# Patient Record
Sex: FEMALE | Race: OTHER | Employment: FULL TIME | URBAN - METROPOLITAN AREA
[De-identification: names, ages, dates, MRNs, and addresses within clinical notes are randomized per-mention and may not be internally consistent; named-entity substitution may affect disease eponyms.]

---

## 2018-02-13 ENCOUNTER — APPOINTMENT (OUTPATIENT)
Dept: LAB | Age: 23
End: 2018-02-13
Attending: FAMILY MEDICINE
Payer: COMMERCIAL

## 2018-02-13 ENCOUNTER — LAB ENCOUNTER (OUTPATIENT)
Dept: LAB | Age: 23
End: 2018-02-13
Attending: FAMILY MEDICINE
Payer: COMMERCIAL

## 2018-02-13 ENCOUNTER — OFFICE VISIT (OUTPATIENT)
Dept: FAMILY MEDICINE CLINIC | Facility: CLINIC | Age: 23
End: 2018-02-13

## 2018-02-13 VITALS
HEART RATE: 58 BPM | HEIGHT: 66 IN | WEIGHT: 156 LBS | DIASTOLIC BLOOD PRESSURE: 74 MMHG | BODY MASS INDEX: 25.07 KG/M2 | TEMPERATURE: 98 F | SYSTOLIC BLOOD PRESSURE: 117 MMHG

## 2018-02-13 DIAGNOSIS — B35.2 TINEA MANUS: ICD-10-CM

## 2018-02-13 DIAGNOSIS — Z00.00 PHYSICAL EXAM: ICD-10-CM

## 2018-02-13 DIAGNOSIS — Z00.00 PHYSICAL EXAM: Primary | ICD-10-CM

## 2018-02-13 LAB
ALBUMIN SERPL BCP-MCNC: 4.1 G/DL (ref 3.5–4.8)
ALBUMIN/GLOB SERPL: 1.8 {RATIO} (ref 1–2)
ALP SERPL-CCNC: 43 U/L (ref 32–100)
ALT SERPL-CCNC: 14 U/L (ref 14–54)
ANION GAP SERPL CALC-SCNC: 7 MMOL/L (ref 0–18)
AST SERPL-CCNC: 21 U/L (ref 15–41)
BASOPHILS # BLD: 0 K/UL (ref 0–0.2)
BASOPHILS NFR BLD: 1 %
BILIRUB SERPL-MCNC: 0.9 MG/DL (ref 0.3–1.2)
BILIRUB UR QL: NEGATIVE
BUN SERPL-MCNC: 8 MG/DL (ref 8–20)
BUN/CREAT SERPL: 11 (ref 10–20)
CALCIUM SERPL-MCNC: 9.2 MG/DL (ref 8.5–10.5)
CHLORIDE SERPL-SCNC: 108 MMOL/L (ref 95–110)
CHOLEST SERPL-MCNC: 116 MG/DL (ref 110–200)
CO2 SERPL-SCNC: 24 MMOL/L (ref 22–32)
COLOR UR: YELLOW
CREAT SERPL-MCNC: 0.73 MG/DL (ref 0.5–1.5)
EOSINOPHIL # BLD: 0.3 K/UL (ref 0–0.7)
EOSINOPHIL NFR BLD: 5 %
ERYTHROCYTE [DISTWIDTH] IN BLOOD BY AUTOMATED COUNT: 14.2 % (ref 11–15)
GLOBULIN PLAS-MCNC: 2.3 G/DL (ref 2.5–3.7)
GLUCOSE SERPL-MCNC: 83 MG/DL (ref 70–99)
GLUCOSE UR-MCNC: NEGATIVE MG/DL
HBA1C MFR BLD: 5 % (ref 4–6)
HCT VFR BLD AUTO: 39.7 % (ref 35–48)
HDLC SERPL-MCNC: 44 MG/DL
HGB BLD-MCNC: 13.3 G/DL (ref 12–16)
HGB UR QL STRIP.AUTO: NEGATIVE
KETONES UR-MCNC: NEGATIVE MG/DL
LDLC SERPL CALC-MCNC: 59 MG/DL (ref 0–99)
LYMPHOCYTES # BLD: 1.8 K/UL (ref 1–4)
LYMPHOCYTES NFR BLD: 32 %
MCH RBC QN AUTO: 25.8 PG (ref 27–32)
MCHC RBC AUTO-ENTMCNC: 33.4 G/DL (ref 32–37)
MCV RBC AUTO: 77.2 FL (ref 80–100)
MONOCYTES # BLD: 0.5 K/UL (ref 0–1)
MONOCYTES NFR BLD: 8 %
NEUTROPHILS # BLD AUTO: 3.1 K/UL (ref 1.8–7.7)
NEUTROPHILS NFR BLD: 55 %
NITRITE UR QL STRIP.AUTO: NEGATIVE
NONHDLC SERPL-MCNC: 72 MG/DL
OSMOLALITY UR CALC.SUM OF ELEC: 285 MOSM/KG (ref 275–295)
PATIENT FASTING: YES
PH UR: 5 [PH] (ref 5–8)
PLATELET # BLD AUTO: 184 K/UL (ref 140–400)
PMV BLD AUTO: 9.4 FL (ref 7.4–10.3)
POTASSIUM SERPL-SCNC: 4.2 MMOL/L (ref 3.3–5.1)
PROT SERPL-MCNC: 6.4 G/DL (ref 5.9–8.4)
PROT UR-MCNC: NEGATIVE MG/DL
RBC # BLD AUTO: 5.14 M/UL (ref 3.7–5.4)
RBC #/AREA URNS AUTO: 1 /HPF
RBC #/AREA URNS AUTO: 2 /HPF
SODIUM SERPL-SCNC: 139 MMOL/L (ref 136–144)
SP GR UR STRIP: 1.02 (ref 1–1.03)
TRIGL SERPL-MCNC: 64 MG/DL (ref 1–149)
TSH SERPL-ACNC: 3.02 UIU/ML (ref 0.45–5.33)
UROBILINOGEN UR STRIP-ACNC: <2
VIT C UR-MCNC: NEGATIVE MG/DL
WBC # BLD AUTO: 5.6 K/UL (ref 4–11)
WBC #/AREA URNS AUTO: 21 /HPF
WBC #/AREA URNS AUTO: 24 /HPF

## 2018-02-13 PROCEDURE — 93005 ELECTROCARDIOGRAM TRACING: CPT

## 2018-02-13 PROCEDURE — 83036 HEMOGLOBIN GLYCOSYLATED A1C: CPT

## 2018-02-13 PROCEDURE — 84443 ASSAY THYROID STIM HORMONE: CPT

## 2018-02-13 PROCEDURE — 99385 PREV VISIT NEW AGE 18-39: CPT | Performed by: FAMILY MEDICINE

## 2018-02-13 PROCEDURE — 80061 LIPID PANEL: CPT

## 2018-02-13 PROCEDURE — 85025 COMPLETE CBC W/AUTO DIFF WBC: CPT

## 2018-02-13 PROCEDURE — 99212 OFFICE O/P EST SF 10 MIN: CPT | Performed by: FAMILY MEDICINE

## 2018-02-13 PROCEDURE — 87086 URINE CULTURE/COLONY COUNT: CPT | Performed by: FAMILY MEDICINE

## 2018-02-13 PROCEDURE — 36415 COLL VENOUS BLD VENIPUNCTURE: CPT

## 2018-02-13 PROCEDURE — 81001 URINALYSIS AUTO W/SCOPE: CPT

## 2018-02-13 PROCEDURE — 93010 ELECTROCARDIOGRAM REPORT: CPT | Performed by: FAMILY MEDICINE

## 2018-02-13 PROCEDURE — 81015 MICROSCOPIC EXAM OF URINE: CPT | Performed by: FAMILY MEDICINE

## 2018-02-13 PROCEDURE — 80053 COMPREHEN METABOLIC PANEL: CPT

## 2018-02-13 PROCEDURE — 99203 OFFICE O/P NEW LOW 30 MIN: CPT | Performed by: FAMILY MEDICINE

## 2018-02-13 RX ORDER — KETOCONAZOLE 200 MG/1
200 TABLET ORAL DAILY
Qty: 30 TABLET | Refills: 0 | Status: SHIPPED | OUTPATIENT
Start: 2018-02-13 | End: 2018-03-13

## 2018-02-13 RX ORDER — CLOTRIMAZOLE AND BETAMETHASONE DIPROPIONATE 10; .64 MG/G; MG/G
1 CREAM TOPICAL 2 TIMES DAILY
Qty: 45 G | Refills: 1 | Status: SHIPPED | OUTPATIENT
Start: 2018-02-13 | End: 2018-03-13

## 2018-02-13 NOTE — PROGRESS NOTES
2/13/2018  9:10 AM    Crystal Muller is a 25year old female. Chief complaint(s): Patient presents with:  Routine Physical    HPI:     Crystal Muller primary complaint is regarding CPE.    2  Crystal Muller is a 25year old female present Constitutional: Negative for appetite change, chills, diaphoresis, fatigue and fever. HENT: Negative for ear pain, hearing loss, nosebleeds and tinnitus. Eyes: Negative for visual disturbance.    Respiratory: Negative for apnea, cough, chest tightnes respiratory distress. Right breast exhibits no mass, no nipple discharge and no skin change. Left breast exhibits no mass, no nipple discharge and no skin change. Lungs clear to auscultation bilaterally. Abdominal: Soft.    Abdomen is soft with normoact dentist), and healthy habits & social competence & responsibilities: Recommendations on physical activity; exercise daily or at least 3 times a week for 30-60 minutes doing cardiovascular exercise.  Patient educated on self breast examination to be done on

## 2018-02-19 ENCOUNTER — TELEPHONE (OUTPATIENT)
Dept: OTHER | Age: 23
End: 2018-02-19

## 2018-02-19 DIAGNOSIS — Z00.00 ROUTINE GENERAL MEDICAL EXAMINATION AT HEALTH CARE FACILITY: Primary | ICD-10-CM

## 2018-02-20 NOTE — TELEPHONE ENCOUNTER
Notes Recorded by Mary Huang RN on 2/19/2018 at 8:01 PM CST  Yosi see telephone encounter. Per lab pt needs to come back for urine culture. See telephone encounter.   ------    Notes Recorded by Jose De Jesus Briones MD on 2/19/2018 at 7:17 PM CST  [place]

## 2018-02-26 NOTE — TELEPHONE ENCOUNTER
See messages below. Chart reviewed, 2/13/18 urine cx results probable contamination. Dr Bucky Hamilton ordered urine cx add on but not ordered. Several calls to pt unsuccessful with no answer or call back.     Urine cx generated and copy mailed to pts' home address on

## 2018-03-13 ENCOUNTER — OFFICE VISIT (OUTPATIENT)
Dept: FAMILY MEDICINE CLINIC | Facility: CLINIC | Age: 23
End: 2018-03-13

## 2018-03-13 VITALS
SYSTOLIC BLOOD PRESSURE: 118 MMHG | HEART RATE: 59 BPM | WEIGHT: 151 LBS | DIASTOLIC BLOOD PRESSURE: 78 MMHG | TEMPERATURE: 98 F | BODY MASS INDEX: 24 KG/M2

## 2018-03-13 DIAGNOSIS — B35.2 TINEA MANUS: Primary | ICD-10-CM

## 2018-03-13 PROCEDURE — 99213 OFFICE O/P EST LOW 20 MIN: CPT | Performed by: FAMILY MEDICINE

## 2018-03-13 PROCEDURE — 99212 OFFICE O/P EST SF 10 MIN: CPT | Performed by: FAMILY MEDICINE

## 2018-03-13 RX ORDER — CLOTRIMAZOLE AND BETAMETHASONE DIPROPIONATE 10; .64 MG/G; MG/G
1 CREAM TOPICAL 2 TIMES DAILY
Qty: 45 G | Refills: 1 | Status: SHIPPED | OUTPATIENT
Start: 2018-03-13 | End: 2018-07-16

## 2018-03-13 RX ORDER — KETOCONAZOLE 200 MG/1
200 TABLET ORAL DAILY
Qty: 30 TABLET | Refills: 0 | Status: SHIPPED | OUTPATIENT
Start: 2018-03-13 | End: 2018-04-10

## 2018-03-13 NOTE — PROGRESS NOTES
3/13/2018  11:12 AM    Jairo Morris is a 25year old female. Chief complaint(s): Patient presents with: Follow - Up  Derm Problem  Lab Results    HPI:     Jairo Morris primary complaint is regarding as above.      Patient is a 62-year-old Psychiatric/Behavioral: Negative for depressed mood. PHYSICAL EXAM:   Physical Exam    Constitutional: She appears well-developed and well-nourished.    /78 (BP Location: Right arm, Patient Position: Sitting, Cuff Size: adult)   Pulse 59   Tem LDL Cholesterol 59 0 - 99 mg/dL   -TSH W REFLEX TO FREE T4   Result Value Ref Range   TSH 3.02 0.45 - 5.33 uIU/mL   -URINALYSIS, ROUTINE   Result Value Ref Range   Urine Color Yellow Yellow   Clarity Urine Hazy (A) Clear   Spec Gravity 1.020 1.002 - 1.03 deterioration or worsening of the medical condition. Also, inform the doctor with any new symptoms or medications' side effects. Keep hands clean and dry. FOLLOW-UP: Schedule a follow-up visit in 1 month / prn.        Orders This Visit:  No orders of

## 2018-03-19 ENCOUNTER — OFFICE VISIT (OUTPATIENT)
Dept: FAMILY MEDICINE CLINIC | Facility: CLINIC | Age: 23
End: 2018-03-19

## 2018-03-19 DIAGNOSIS — M94.0 COSTOCHONDRITIS: Primary | ICD-10-CM

## 2018-03-19 PROCEDURE — 99212 OFFICE O/P EST SF 10 MIN: CPT | Performed by: FAMILY MEDICINE

## 2018-03-19 PROCEDURE — 93005 ELECTROCARDIOGRAM TRACING: CPT | Performed by: FAMILY MEDICINE

## 2018-03-19 PROCEDURE — 93000 ELECTROCARDIOGRAM COMPLETE: CPT | Performed by: FAMILY MEDICINE

## 2018-03-19 PROCEDURE — 99214 OFFICE O/P EST MOD 30 MIN: CPT | Performed by: FAMILY MEDICINE

## 2018-03-20 VITALS
BODY MASS INDEX: 25 KG/M2 | HEART RATE: 53 BPM | DIASTOLIC BLOOD PRESSURE: 69 MMHG | SYSTOLIC BLOOD PRESSURE: 110 MMHG | WEIGHT: 153 LBS | TEMPERATURE: 98 F

## 2018-03-20 NOTE — PROGRESS NOTES
3/20/2018  8:35 AM    Cy Pérez is a 25year old female. Chief complaint(s): Patient presents with:  Chest Pain    HPI:     Cy Pérez primary complaint is regarding chest pain.      Patient is a 81-year-old  female with no sig Psychiatric/Behavioral: Negative for depressed mood. PHYSICAL EXAM:   Physical Exam    Constitutional: She appears well-developed and well-nourished.    /69   Pulse 53   Temp 98.4 °F (36.9 °C) (Oral)   Wt 153 lb (69.4 kg)   LMP 02/17/2018 ([place] <130 mg/dL   LDL Cholesterol 59 0 - 99 mg/dL   -TSH W REFLEX TO FREE T4   Result Value Ref Range   TSH 3.02 0.45 - 5.33 uIU/mL   -URINALYSIS, ROUTINE   Result Value Ref Range   Urine Color Yellow Yellow   Clarity Urine Hazy (A) Clear   Spec Gravity 1.020 1 a follow-up visit in prn . Orders This Visit:  No orders of the defined types were placed in this encounter.       Meds This Visit:    No prescriptions requested or ordered in this encounter    Imaging & Referrals:  ELECTROCARDIOGRAM, COMPLETE

## 2018-04-10 ENCOUNTER — OFFICE VISIT (OUTPATIENT)
Dept: FAMILY MEDICINE CLINIC | Facility: CLINIC | Age: 23
End: 2018-04-10

## 2018-04-10 ENCOUNTER — LAB ENCOUNTER (OUTPATIENT)
Dept: LAB | Age: 23
End: 2018-04-10
Attending: FAMILY MEDICINE
Payer: COMMERCIAL

## 2018-04-10 VITALS
SYSTOLIC BLOOD PRESSURE: 119 MMHG | HEART RATE: 66 BPM | WEIGHT: 151 LBS | BODY MASS INDEX: 24 KG/M2 | TEMPERATURE: 98 F | DIASTOLIC BLOOD PRESSURE: 76 MMHG

## 2018-04-10 DIAGNOSIS — B35.2 TINEA MANUS: ICD-10-CM

## 2018-04-10 DIAGNOSIS — K21.9 GASTROESOPHAGEAL REFLUX DISEASE WITHOUT ESOPHAGITIS: Primary | ICD-10-CM

## 2018-04-10 PROCEDURE — 99212 OFFICE O/P EST SF 10 MIN: CPT | Performed by: FAMILY MEDICINE

## 2018-04-10 PROCEDURE — 84450 TRANSFERASE (AST) (SGOT): CPT

## 2018-04-10 PROCEDURE — 36415 COLL VENOUS BLD VENIPUNCTURE: CPT

## 2018-04-10 PROCEDURE — 84460 ALANINE AMINO (ALT) (SGPT): CPT

## 2018-04-10 PROCEDURE — 99214 OFFICE O/P EST MOD 30 MIN: CPT | Performed by: FAMILY MEDICINE

## 2018-04-10 RX ORDER — KETOCONAZOLE 200 MG/1
200 TABLET ORAL DAILY
Qty: 30 TABLET | Refills: 0 | Status: SHIPPED | OUTPATIENT
Start: 2018-04-10 | End: 2018-07-16

## 2018-04-10 RX ORDER — NAFTIFINE HYDROCHLORIDE 20 MG/G
CREAM TOPICAL
Qty: 30 G | Refills: 0 | Status: SHIPPED | OUTPATIENT
Start: 2018-04-10 | End: 2018-07-16

## 2018-04-10 RX ORDER — FAMOTIDINE 40 MG/1
40 TABLET, FILM COATED ORAL DAILY
Qty: 60 TABLET | Refills: 1 | Status: SHIPPED | OUTPATIENT
Start: 2018-04-10 | End: 2018-07-16

## 2018-04-10 NOTE — PROGRESS NOTES
4/10/2018  10:21 AM    Diego Quiroz is a 25year old female. Chief complaint(s): Patient presents with: Follow - Up  Muscle Pain  Pain: pt c/o left side pain X 1 time    HPI:     Diego Quiroz primary complaint is regarding abdominal pain. ketoconazole 200 MG Oral Tab Take 1 tablet (200 mg total) by mouth daily. Disp: 30 tablet Rfl: 0   famotidine 40 MG Oral Tab Take 1 tablet (40 mg total) by mouth daily.  Disp: 60 tablet Rfl: 1   clotrimazole-betamethasone 1-0.05 % External Cream Apply 1 A Brianda Walker     Results for orders placed or performed in visit on 63/80/12  -COMP METABOLIC PANEL (14)   Result Value Ref Range   Glucose 83 70 - 99 mg/dL   Sodium 139 136 - 144 mmol/L   Potassium 4.2 3.3 - 5.1 mmol/L   Chloride 108 95 - 110 mmol/L   CO2 2 39.7 35.0 - 48.0 %   MCV 77.2 (L) 80.0 - 100.0 fL   MCH 25.8 (L) 27.0 - 32.0 pg   MCHC 33.4 32.0 - 37.0 g/dl   RDW 14.2 11.0 - 15.0 %    140 - 400 K/UL   MPV 9.4 7.4 - 10.3 fL   Neutrophil % 55 %   Lymphocyte % 32 %   Monocyte % 8 %   Eosinophil % 5 Placed This Encounter      AST (SGOT) [E]      ALT(SGPT) [E]      RECOMMENDATIONS given include: Please, call our office with any questions or concerns.  Notify Dr Shannan Sylvester or the [address] [place] [address] if there is a deterioration or worsening of the medical condi

## 2018-04-11 ENCOUNTER — TELEPHONE (OUTPATIENT)
Dept: FAMILY MEDICINE CLINIC | Facility: CLINIC | Age: 23
End: 2018-04-11

## 2018-04-11 NOTE — TELEPHONE ENCOUNTER
----- Message from Uriel Black MD sent at 4/10/2018  6:25 PM CDT -----  Please call patient, results are within normal limits.

## 2018-04-16 ENCOUNTER — OFFICE VISIT (OUTPATIENT)
Dept: FAMILY MEDICINE CLINIC | Facility: CLINIC | Age: 23
End: 2018-04-16

## 2018-04-16 VITALS
BODY MASS INDEX: 24 KG/M2 | HEART RATE: 57 BPM | TEMPERATURE: 98 F | SYSTOLIC BLOOD PRESSURE: 112 MMHG | WEIGHT: 151 LBS | DIASTOLIC BLOOD PRESSURE: 75 MMHG

## 2018-04-16 DIAGNOSIS — M94.0 COSTOCHONDRITIS: Primary | ICD-10-CM

## 2018-04-16 PROCEDURE — 99213 OFFICE O/P EST LOW 20 MIN: CPT | Performed by: FAMILY MEDICINE

## 2018-04-16 PROCEDURE — 99212 OFFICE O/P EST SF 10 MIN: CPT | Performed by: FAMILY MEDICINE

## 2018-04-16 NOTE — PROGRESS NOTES
4/16/2018  2:40 PM    Griffin Coley is a 25year old female. Chief complaint(s): Patient presents with:  Return To Work    HPI:     Griffin Brijesh primary complaint is regarding costochondritis.      Patient is a 73-year-old  female wi fatigue and fever. HENT: Negative for ear pain and sore throat. Respiratory: Negative for cough and wheezing. Cardiovascular: Negative for chest pain and palpitations.    Gastrointestinal: Negative for nausea, vomiting, abdominal pain, diarrhea and to work today. FOLLOW-UP: Schedule a follow-up visit in  [facility]. Orders This Visit:  No orders of the defined types were placed in this encounter.       Meds This Visit:    No prescriptions requested or ordered in this encounter    Imaging &

## 2018-07-16 ENCOUNTER — OFFICE VISIT (OUTPATIENT)
Dept: FAMILY MEDICINE CLINIC | Facility: CLINIC | Age: 23
End: 2018-07-16

## 2018-07-16 VITALS
DIASTOLIC BLOOD PRESSURE: 74 MMHG | TEMPERATURE: 98 F | BODY MASS INDEX: 24 KG/M2 | SYSTOLIC BLOOD PRESSURE: 111 MMHG | WEIGHT: 150 LBS | HEART RATE: 49 BPM

## 2018-07-16 DIAGNOSIS — J30.1 ALLERGIC RHINITIS DUE TO POLLEN, UNSPECIFIED SEASONALITY: Primary | ICD-10-CM

## 2018-07-16 PROCEDURE — 99212 OFFICE O/P EST SF 10 MIN: CPT | Performed by: FAMILY MEDICINE

## 2018-07-16 PROCEDURE — 99214 OFFICE O/P EST MOD 30 MIN: CPT | Performed by: FAMILY MEDICINE

## 2018-07-16 RX ORDER — FLUTICASONE PROPIONATE 50 MCG
2 SPRAY, SUSPENSION (ML) NASAL DAILY
Qty: 1 INHALER | Refills: 1 | Status: SHIPPED | OUTPATIENT
Start: 2018-07-16 | End: 2018-08-18

## 2018-07-16 RX ORDER — FEXOFENADINE HCL 180 MG/1
180 TABLET ORAL DAILY
Qty: 90 TABLET | Refills: 1 | Status: SHIPPED | OUTPATIENT
Start: 2018-07-16 | End: 2018-08-18

## 2018-07-16 NOTE — PROGRESS NOTES
2018 4:23 PM    aDnilo Crespo, : 10/25/1995  Patient presents with:  Sore Throat  Nasal Congestion    HPI:     Danilo Crespo is a 25year old female who presents for evaluation of a chief complaint of runny nose, sore throat and ear nuzhat children: N/A     Occupational History  None on file     Social History Main Topics   Smoking status: Never Smoker    Smokeless tobacco: Never Used    Alcohol use No    Drug use: No    Sexual activity: No     Other Topics Concern   None on file     Social or concerns. Notify the doctor if there is a deterioration or worsening of the medical condition. Also, inform the doctor with any new symptoms or medications' side effects. .    FOLLOW-UP:   Instructed to call if new or worsening symptoms develop.   Schedul

## 2018-08-02 ENCOUNTER — OCC HEALTH (OUTPATIENT)
Dept: OTHER | Facility: HOSPITAL | Age: 23
End: 2018-08-02
Attending: PREVENTIVE MEDICINE

## 2018-08-02 ENCOUNTER — OFFICE VISIT (OUTPATIENT)
Dept: FAMILY MEDICINE CLINIC | Facility: CLINIC | Age: 23
End: 2018-08-02
Payer: COMMERCIAL

## 2018-08-02 VITALS
SYSTOLIC BLOOD PRESSURE: 115 MMHG | TEMPERATURE: 98 F | WEIGHT: 147 LBS | DIASTOLIC BLOOD PRESSURE: 70 MMHG | HEIGHT: 66 IN | HEART RATE: 51 BPM | BODY MASS INDEX: 23.63 KG/M2

## 2018-08-02 DIAGNOSIS — Z02.0 SCHOOL PHYSICAL EXAM: Primary | ICD-10-CM

## 2018-08-02 DIAGNOSIS — Z01.84 IMMUNITY STATUS TESTING: Primary | ICD-10-CM

## 2018-08-02 LAB
HBV SURFACE AB SER QL: NONREACTIVE
HBV SURFACE AB SERPL IA-ACNC: 7.98 MIU/ML
RUBV IGG SER QL: POSITIVE
RUBV IGG SER-ACNC: 123.1 IU/ML (ref 10–?)

## 2018-08-02 PROCEDURE — 86735 MUMPS ANTIBODY: CPT

## 2018-08-02 PROCEDURE — 99214 OFFICE O/P EST MOD 30 MIN: CPT | Performed by: FAMILY MEDICINE

## 2018-08-02 PROCEDURE — 86787 VARICELLA-ZOSTER ANTIBODY: CPT

## 2018-08-02 PROCEDURE — 86762 RUBELLA ANTIBODY: CPT

## 2018-08-02 PROCEDURE — 86480 TB TEST CELL IMMUN MEASURE: CPT

## 2018-08-02 PROCEDURE — 86765 RUBEOLA ANTIBODY: CPT

## 2018-08-02 PROCEDURE — 86706 HEP B SURFACE ANTIBODY: CPT

## 2018-08-02 PROCEDURE — 99212 OFFICE O/P EST SF 10 MIN: CPT | Performed by: FAMILY MEDICINE

## 2018-08-02 NOTE — PROGRESS NOTES
8/2/2018  1:37 PM    Ac Posada is a 25year old female.     Chief complaint(s): Patient presents with:  Complete Form: Patient needs forms for nursing program, last Px was 02/13/18    HPI:     Ac Posada primary complaint is regarding leif (Cervarix)        06/08/2009 09/21/2010 02/07/2011      IPV                   03/23/2001      MMR                   02/01/1998 12/22/2000      Measles               07/25/1997 02/01/1998      Meningococcal (Katerin Romero)                          03/ Psychiatric/Behavioral: Negative for sleep disturbance and depressed mood. The patient is not nervous/anxious. PHYSICAL EXAM:   Physical Exam    Constitutional: She appears well-developed and well-nourished.    /70 (BP Location: Right arm, Pa were placed in this encounter.       Meds This Visit:    No prescriptions requested or ordered in this encounter    Imaging & Referrals:  None         Karey Galeazzi, MD

## 2018-08-03 LAB
MEV IGG SER IA-ACNC: NEGATIVE
MUV IGG SER IA-ACNC: POSITIVE
VZV IGG SER IA-ACNC: POSITIVE

## 2018-08-09 LAB
M TB TUBERC IFN-G BLD QL: NEGATIVE
M TB TUBERC IFN-G/MITOGEN IGNF BLD: 0.02 IU/ML
M TB TUBERC IGNF/MITOGEN IGNF CONTROL: >10 IU/ML
MITOGEN IGNF BCKGRD COR BLD-ACNC: 0.07 IU/ML

## 2018-08-13 ENCOUNTER — TELEPHONE (OUTPATIENT)
Dept: FAMILY MEDICINE CLINIC | Facility: CLINIC | Age: 23
End: 2018-08-13

## 2018-08-13 NOTE — TELEPHONE ENCOUNTER
Patient needs orders for Hep series and MMR booster for school. Needs ASAP. Please contact when ready.

## 2018-08-14 NOTE — TELEPHONE ENCOUNTER
If she needs boosters and vaccinations her make an appointment and return to clinic with immunization records.

## 2018-08-18 ENCOUNTER — NURSE ONLY (OUTPATIENT)
Dept: FAMILY MEDICINE CLINIC | Facility: CLINIC | Age: 23
End: 2018-08-18
Payer: COMMERCIAL

## 2018-08-18 VITALS
SYSTOLIC BLOOD PRESSURE: 106 MMHG | DIASTOLIC BLOOD PRESSURE: 73 MMHG | HEART RATE: 56 BPM | BODY MASS INDEX: 23 KG/M2 | WEIGHT: 141 LBS

## 2018-08-18 DIAGNOSIS — B35.2 TINEA MANUS: ICD-10-CM

## 2018-08-18 DIAGNOSIS — Z28.3 IMMUNIZATIONS INCOMPLETE: Primary | ICD-10-CM

## 2018-08-18 PROCEDURE — 99214 OFFICE O/P EST MOD 30 MIN: CPT | Performed by: FAMILY MEDICINE

## 2018-08-18 PROCEDURE — 90707 MMR VACCINE SC: CPT | Performed by: FAMILY MEDICINE

## 2018-08-18 PROCEDURE — 99212 OFFICE O/P EST SF 10 MIN: CPT | Performed by: FAMILY MEDICINE

## 2018-08-18 PROCEDURE — 90746 HEPB VACCINE 3 DOSE ADULT IM: CPT | Performed by: FAMILY MEDICINE

## 2018-08-18 PROCEDURE — 90471 IMMUNIZATION ADMIN: CPT | Performed by: FAMILY MEDICINE

## 2018-08-18 PROCEDURE — 90472 IMMUNIZATION ADMIN EACH ADD: CPT | Performed by: FAMILY MEDICINE

## 2018-08-18 RX ORDER — NAFTIFINE HYDROCHLORIDE 20 MG/G
CREAM TOPICAL
Qty: 30 G | Refills: 0 | Status: SHIPPED | OUTPATIENT
Start: 2018-08-18

## 2018-08-18 RX ORDER — NAFTIFINE HYDROCHLORIDE 20 MG/G
CREAM TOPICAL
Qty: 30 G | Refills: 0 | Status: SHIPPED | OUTPATIENT
Start: 2018-08-18 | End: 2018-08-18

## 2018-08-18 NOTE — PROGRESS NOTES
8/18/2018  8:03 AM    Babs Argueta is a 25year old female. Chief complaint(s): Patient presents with: Injection    HPI:     Babs Ellie primary complaint is regarding vaccines.      Patient is a 28-year-old female who presents for follow TDAP                  12/16/2015      Varicella             02/11/2003 09/21/2010      Medications (Active prior to today's visit):    Current Outpatient Prescriptions:  Naftifine HCl (NAFTIN) 2 % External Cream Apply to affected area once a day Disp: Nonreactive    Heptatitis B Surface Ab Quant 7.98 mIU/mL   -QUANTIFERON TB   Result Value Ref Range   Quantiferon TB Gold In-Tube Negative Negative   Quantiferon-TB1 Minus NIL 0.02 0.00 - 0.34 IU/mL   Quantiferon Mitogen Minus NIL >10.00 IU/mL   Quantifero

## 2018-12-13 ENCOUNTER — APPOINTMENT (OUTPATIENT)
Dept: LAB | Age: 23
End: 2018-12-13
Attending: PHYSICIAN ASSISTANT
Payer: COMMERCIAL

## 2018-12-13 ENCOUNTER — OFFICE VISIT (OUTPATIENT)
Dept: FAMILY MEDICINE CLINIC | Facility: CLINIC | Age: 23
End: 2018-12-13
Payer: COMMERCIAL

## 2018-12-13 ENCOUNTER — TELEPHONE (OUTPATIENT)
Dept: FAMILY MEDICINE CLINIC | Facility: CLINIC | Age: 23
End: 2018-12-13

## 2018-12-13 VITALS
WEIGHT: 154 LBS | TEMPERATURE: 98 F | BODY MASS INDEX: 24.75 KG/M2 | HEART RATE: 58 BPM | HEIGHT: 66 IN | SYSTOLIC BLOOD PRESSURE: 106 MMHG | RESPIRATION RATE: 18 BRPM | DIASTOLIC BLOOD PRESSURE: 71 MMHG

## 2018-12-13 DIAGNOSIS — R07.89 CHEST DISCOMFORT: ICD-10-CM

## 2018-12-13 DIAGNOSIS — R07.89 CHEST DISCOMFORT: Primary | ICD-10-CM

## 2018-12-13 DIAGNOSIS — R06.02 SHORTNESS OF BREATH: ICD-10-CM

## 2018-12-13 DIAGNOSIS — R07.89 OTHER CHEST PAIN: ICD-10-CM

## 2018-12-13 PROBLEM — R07.82 INTERCOSTAL PAIN: Status: ACTIVE | Noted: 2018-12-13

## 2018-12-13 PROCEDURE — 93005 ELECTROCARDIOGRAM TRACING: CPT

## 2018-12-13 PROCEDURE — 99212 OFFICE O/P EST SF 10 MIN: CPT | Performed by: PHYSICIAN ASSISTANT

## 2018-12-13 PROCEDURE — 93010 ELECTROCARDIOGRAM REPORT: CPT | Performed by: PHYSICIAN ASSISTANT

## 2018-12-13 PROCEDURE — 99213 OFFICE O/P EST LOW 20 MIN: CPT | Performed by: PHYSICIAN ASSISTANT

## 2018-12-13 RX ORDER — ALBUTEROL SULFATE 90 UG/1
2 AEROSOL, METERED RESPIRATORY (INHALATION) EVERY 4 HOURS PRN
Qty: 18 G | Refills: 5 | Status: SHIPPED | OUTPATIENT
Start: 2018-12-13

## 2018-12-13 NOTE — PROGRESS NOTES
HPI:     HPI    26year-old female is here in the office complaining of chest pain and SOB yesterday after eating lunch. The pain is dull, does not radiate anywhere, last couple seconds.  Patient works full time at [employer], she carries/lifts 10-50 lbs and a Substance and Sexual Activity      Alcohol use: No      Drug use: No      Sexual activity: No    Other Topics      Concerns:        Not on file    Social History Narrative      Not on file      Review of Systems:   Review of Systems   Constitutional: Adam Mandujano normal mood and affect. Her behavior is normal.       Assessment and Plan[de-identified]     Problem List Items Addressed This Visit        Cardiovascular    Other chest pain     Will order EKG. Advise patient to take Tylenol/Motrin prn for pain.  It might be muscle spa

## 2019-02-05 ENCOUNTER — TELEPHONE (OUTPATIENT)
Dept: FAMILY MEDICINE CLINIC | Facility: CLINIC | Age: 24
End: 2019-02-05

## 2019-02-05 NOTE — TELEPHONE ENCOUNTER
Pt saw Des Pickard on 12/13/18 and she is requesting a note stating that she is able to return to work, pt needs this as soon as possible.    Pt would like to pick this up in [place] if possible, please call pt when ready and let pt know which location to go to

## 2019-02-05 NOTE — TELEPHONE ENCOUNTER
Note is ready for  at [address] [place] [address] [place] location, pt notified and stated understanding

## 2022-09-15 ENCOUNTER — OFFICE VISIT (OUTPATIENT)
Dept: FAMILY MEDICINE CLINIC | Facility: CLINIC | Age: 27
End: 2022-09-15
Payer: COMMERCIAL

## 2022-09-15 VITALS
HEIGHT: 66 IN | SYSTOLIC BLOOD PRESSURE: 111 MMHG | WEIGHT: 190 LBS | DIASTOLIC BLOOD PRESSURE: 73 MMHG | HEART RATE: 69 BPM | BODY MASS INDEX: 30.53 KG/M2

## 2022-09-15 DIAGNOSIS — Z01.419 WELL WOMAN EXAM WITH ROUTINE GYNECOLOGICAL EXAM: Primary | ICD-10-CM

## 2022-09-15 PROCEDURE — 3008F BODY MASS INDEX DOCD: CPT | Performed by: NURSE PRACTITIONER

## 2022-09-15 PROCEDURE — 3078F DIAST BP <80 MM HG: CPT | Performed by: NURSE PRACTITIONER

## 2022-09-15 PROCEDURE — 3074F SYST BP LT 130 MM HG: CPT | Performed by: NURSE PRACTITIONER

## 2022-09-15 PROCEDURE — 99395 PREV VISIT EST AGE 18-39: CPT | Performed by: NURSE PRACTITIONER

## 2022-09-16 LAB
C TRACH DNA SPEC QL NAA+PROBE: NEGATIVE
N GONORRHOEA DNA SPEC QL NAA+PROBE: NEGATIVE

## 2022-09-16 RX ORDER — METRONIDAZOLE 500 MG/1
500 TABLET ORAL 2 TIMES DAILY
Qty: 14 TABLET | Refills: 0 | Status: SHIPPED | OUTPATIENT
Start: 2022-09-16 | End: 2022-09-23

## 2022-09-17 LAB
GENITAL VAGINOSIS SCREEN: POSITIVE
TRICHOMONAS SCREEN: NEGATIVE

## 2022-09-19 PROBLEM — R07.89 OTHER CHEST PAIN: Status: RESOLVED | Noted: 2018-12-13 | Resolved: 2022-09-19

## 2022-09-19 PROBLEM — R06.02 SHORTNESS OF BREATH: Status: RESOLVED | Noted: 2018-12-13 | Resolved: 2022-09-19

## 2022-09-19 NOTE — ASSESSMENT & PLAN NOTE
sreening labs  Please aim to eat a diet high in fresh fruits and vegetables, lean protein sources, complex carbohydrates and limited processed and fast foods. Try to get at least 150 minutes of exercise per week-a combination of weight resistance and cardio is preferred.     Pap w gc, vag culture  Encourage use of condoms each time to decrease risk of pregnancy  Declines any back up methods  Encourage covid vaccines

## 2024-04-08 ENCOUNTER — OFFICE VISIT (OUTPATIENT)
Dept: FAMILY MEDICINE CLINIC | Facility: CLINIC | Age: 29
End: 2024-04-08
Payer: COMMERCIAL

## 2024-04-08 VITALS
DIASTOLIC BLOOD PRESSURE: 72 MMHG | BODY MASS INDEX: 28.22 KG/M2 | HEART RATE: 71 BPM | HEIGHT: 66 IN | SYSTOLIC BLOOD PRESSURE: 119 MMHG | WEIGHT: 175.63 LBS

## 2024-04-08 DIAGNOSIS — N93.9 ABNORMAL UTERINE BLEEDING: Primary | ICD-10-CM

## 2024-04-08 DIAGNOSIS — R10.9 ABDOMINAL CRAMPING: ICD-10-CM

## 2024-04-08 DIAGNOSIS — Z32.01 POSITIVE PREGNANCY TEST (HCC): ICD-10-CM

## 2024-04-08 LAB
CONTROL LINE PRESENT WITH A CLEAR BACKGROUND (YES/NO): YES YES/NO
CUVETTE LOT #: NORMAL NUMERIC
HEMOGLOBIN: 13 G/DL (ref 12–16)
KIT LOT #: NORMAL NUMERIC
PREGNANCY TEST, URINE: POSITIVE

## 2024-04-08 PROCEDURE — 81025 URINE PREGNANCY TEST: CPT

## 2024-04-08 PROCEDURE — 85018 HEMOGLOBIN: CPT

## 2024-04-08 PROCEDURE — 99213 OFFICE O/P EST LOW 20 MIN: CPT

## 2024-04-08 NOTE — ASSESSMENT & PLAN NOTE
Missed/irregular period.  Has since resolved with heavy bleeding which is now slowing.  HGB POC in office 13.0  HCG in office positive.  With AUB and no pain will order lower abd US and TSVUS.  Referral to OB.  Red flags reviewed.    Patient aware of plan of care. All questions answered to satisfaction of the patient. Patient instructed to call office or reach out via [employer] if any issues arise. For urgent issues and/or reviewed red flags please proceed to the urgent care or ER.  Also, inform the nurse practitioner with any new symptoms or medication side effects.

## 2024-04-08 NOTE — PROGRESS NOTES
Subjective:   Veronika Holland is a 28 year old female who presents for Vaginal Problem (Blood clots yesterday, took tylenol, more heavy periods, change twice or 3 times pads)   Patient is a pleasant 28-year-old female with no documented past medical history.  Patient presents to office today for evaluation of menstrual bleeding.  Patient states Her LMP: 02/06/24, usually last 5-6 days, heaviest first 2-3 days. Regular around 28 days. She is normally regular, does not miss periods. She reports in march She was sexually active in this window. She was going to take pregnancy test in March but she was spotting for 3 days straight that came and went. She never tested after that since this was 3/17/24. Saturday she had her period. Not heavy first day, then She had bad cramping. Took tylenol. Large clots like the size of hail in shower and she was dizzy. She uses tampons usually. Now since Saturday she has been using pads, heavy bleeding and 6 pads per day. She felt a little dizzy yesterday. Now today she is slowing down but wanted to be evaluated.         History reviewed. No pertinent past medical history.   History reviewed. No pertinent surgical history.     History/Other:    Chief Complaint Reviewed and Verified  Nursing Notes Reviewed and   Verified  Tobacco Reviewed  Allergies Reviewed  Medications Reviewed    Problem List Reviewed  Medical History Reviewed  Surgical History   Reviewed  OB Status Reviewed  Family History Reviewed  Social History   Reviewed         Tobacco:  She has never smoked tobacco.    No current outpatient medications on file.         Review of Systems:  Review of Systems   Constitutional:  Negative for chills and fever.   Respiratory:  Negative for shortness of breath and wheezing.    Cardiovascular:  Negative for chest pain.   Gastrointestinal:  Negative for abdominal pain, blood in stool, diarrhea, nausea and vomiting.   Genitourinary:  Positive for menstrual problem and  vaginal bleeding. Negative for decreased urine volume, frequency, genital sores, hematuria, pelvic pain, urgency, vaginal discharge and vaginal pain.         Objective:   /72 (BP Location: Right arm, Patient Position: Sitting, Cuff Size: adult)   Pulse 71   Ht 5' 6\" (1.676 m)   Wt 175 lb 9.6 oz (79.7 kg)   LMP 04/06/2024   BMI 28.34 kg/m²  Estimated body mass index is 28.34 kg/m² as calculated from the following:    Height as of this encounter: 5' 6\" (1.676 m).    Weight as of this encounter: 175 lb 9.6 oz (79.7 kg).  Physical Exam  Vitals and nursing note reviewed.   Constitutional:       Appearance: She is normal weight.   Cardiovascular:      Pulses: Normal pulses.      Heart sounds: Normal heart sounds. No murmur heard.  Pulmonary:      Effort: No respiratory distress.      Breath sounds: No stridor.   Abdominal:      General: Abdomen is flat. There is no distension.      Palpations: Abdomen is soft. There is no mass.   Musculoskeletal:         General: Normal range of motion.   Skin:     General: Skin is warm and dry.      Capillary Refill: Capillary refill takes less than 2 seconds.   Neurological:      Mental Status: She is alert.           Assessment & Plan:   1. Abnormal uterine bleeding (Primary)  Assessment & Plan:  Missed/irregular period.  Has since resolved with heavy bleeding which is now slowing.  HGB POC in office 13.0  HCG in office positive.  With AUB and no pain will order lower abd US and TSVUS.  Referral to OB.  Red flags reviewed.    Patient aware of plan of care. All questions answered to satisfaction of the patient. Patient instructed to call office or reach out via [employer] if any issues arise. For urgent issues and/or reviewed red flags please proceed to the urgent care or ER.  Also, inform the nurse practitioner with any new symptoms or medication side effects.        2. Abdominal cramping  Assessment & Plan:  Mild pain now.  Notes her abdomen became firm yesterday while in  pain.   Taking tylenol with good relief.   3. Positive pregnancy test (HCC)  Assessment & Plan:  Likely miscarriage  Follow up with abd US  Not actively planning pregnancy   Follow up with OB.        No follow-ups on file.    Mohit Valdez, APRN, 4/8/2024, 9:47 AM

## 2024-04-08 NOTE — ASSESSMENT & PLAN NOTE
Mild pain now.  Notes her abdomen became firm yesterday while in pain.   Taking tylenol with good relief.

## 2024-04-19 PROBLEM — Z00.00 ENCOUNTER FOR WELLNESS EXAMINATION IN ADULT: Status: ACTIVE | Noted: 2022-09-15

## 2024-09-25 ENCOUNTER — TELEPHONE (OUTPATIENT)
Dept: FAMILY MEDICINE CLINIC | Facility: CLINIC | Age: 29
End: 2024-09-25

## 2024-09-26 NOTE — TELEPHONE ENCOUNTER
Attempted to contact patient to further triage. Left message to call back.  Transfer to triage.

## 2024-09-26 NOTE — TELEPHONE ENCOUNTER
Patient scheduled online appointment for the following.     I had trouble seeing like dizziness and then I felt my lip and right hand Tingling.       [employer] message sent

## 2024-09-27 NOTE — PROGRESS NOTES
Subjective:   Veronika Holland is a 28 year old female who presents for Dizziness ( trouble seeing, blurry vision , lip & right hand tingly )   Patient is a pleasant 28-year-old female with no documented past medical history.  Patient presents to office today for evaluation of tingling, dizziness, and change in vision. Patient states she was at work she just ate lunch. She had only had a small piece of bread and coffee prior to this which is unusual for her. She ate a large amount of pasta quickly. She went back to her desk. Went back to her desk, She started to get some dizziness, fatigue and change in vision. She had some tingling in her lip and had some tingling in right had and fingers. This has happened to her in the past before as well when she ate large meal when younger. She then felt better shortly after but had a headache for a little time.  Symptoms have since resolved. No further issues.     Of note patient seen in 04/24 for AUB and dizziness. Never followed up with Ob or ordered US pelvis. Needs to schedule. Denies further bleeding.         History reviewed. No pertinent past medical history.   History reviewed. No pertinent surgical history.     History/Other:    Chief Complaint Reviewed and Verified  Nursing Notes Reviewed and   Verified  Tobacco Reviewed  Allergies Reviewed  Medications Reviewed    Problem List Reviewed  Medical History Reviewed  Surgical History   Reviewed  OB Status Reviewed  Family History Reviewed  Social History   Reviewed         Tobacco:  She has never smoked tobacco.    No current outpatient medications on file.         Review of Systems:  Review of Systems   Constitutional:  Positive for fatigue. Negative for activity change and appetite change.   HENT: Negative.  Negative for congestion, postnasal drip, rhinorrhea, sore throat, tinnitus and voice change.    Eyes:  Positive for visual disturbance.   Respiratory: Negative.  Negative for apnea, cough, chest  tightness and shortness of breath.    Cardiovascular: Negative.  Negative for chest pain and leg swelling.   Gastrointestinal: Negative.  Negative for abdominal pain, anal bleeding, blood in stool, constipation, diarrhea, nausea and vomiting.   Genitourinary: Negative.  Negative for difficulty urinating, flank pain and menstrual problem.   Musculoskeletal: Negative.  Negative for joint swelling.   Skin: Negative.    Neurological:  Positive for dizziness and headaches.   Psychiatric/Behavioral: Negative.  Negative for agitation.          Objective:   /69 (BP Location: Right arm, Patient Position: Sitting, Cuff Size: large)   Pulse 62   Ht 5' 6\" (1.676 m)   Wt 171 lb 3.2 oz (77.7 kg)   LMP 09/27/2024   SpO2 97%   BMI 27.63 kg/m²  Estimated body mass index is 27.63 kg/m² as calculated from the following:    Height as of this encounter: 5' 6\" (1.676 m).    Weight as of this encounter: 171 lb 3.2 oz (77.7 kg).  Physical Exam  Vitals and nursing note reviewed.   Constitutional:       General: She is not in acute distress.     Appearance: Normal appearance. She is well-developed and normal weight.   HENT:      Head: Normocephalic and atraumatic.      Right Ear: Tympanic membrane, ear canal and external ear normal. There is no impacted cerumen.      Left Ear: Tympanic membrane, ear canal and external ear normal. There is no impacted cerumen.      Nose: Nose normal. No congestion or rhinorrhea.      Mouth/Throat:      Pharynx: No oropharyngeal exudate or posterior oropharyngeal erythema.   Eyes:      Conjunctiva/sclera: Conjunctivae normal.      Pupils: Pupils are equal, round, and reactive to light.   Neck:      Thyroid: No thyromegaly.   Cardiovascular:      Rate and Rhythm: Normal rate and regular rhythm.      Pulses: Normal pulses.      Heart sounds: Normal heart sounds. No murmur heard.  Pulmonary:      Effort: Pulmonary effort is normal. No respiratory distress.      Breath sounds: Normal breath sounds. No  wheezing or rales.   Chest:      Chest wall: No tenderness.   Abdominal:      General: Bowel sounds are normal. There is no distension.      Palpations: Abdomen is soft.      Tenderness: There is no abdominal tenderness.   Musculoskeletal:         General: No tenderness. Normal range of motion.      Cervical back: Normal range of motion and neck supple.   Lymphadenopathy:      Cervical: No cervical adenopathy.   Skin:     General: Skin is warm and dry.      Capillary Refill: Capillary refill takes less than 2 seconds.      Findings: No rash.   Neurological:      Mental Status: She is alert and oriented to person, place, and time.      Coordination: Coordination normal.   Psychiatric:         Behavior: Behavior normal.         Thought Content: Thought content normal.         Judgment: Judgment normal.           Assessment & Plan:   1. Postprandial hypotension (Primary)  -     CBC With Differential With Platelet  -     Comp Metabolic Panel (14)  -     TSH W Reflex To Free T4  -     HCG, Beta Subunit (Quant Pregnancy Test)  2. Postprandial hypoglycemia  -     CBC With Differential With Platelet  -     Comp Metabolic Panel (14)  -     TSH W Reflex To Free T4  -     HCG, Beta Subunit (Quant Pregnancy Test)  3. Abnormal uterine bleeding    1. Postprandial hypotension  Symptoms of hypotension and hypoglycemia status post large carbohydrate meal.  Has happened in the past.  Symptoms have since resolved.  Check CBC, CMP, TSH and hCG.  Counseled on smaller more frequent meals.  Avoid large meals and 1 sitting  Drink water before during and after meals.  Limit alcohol caffeine  Red flags reviewed, follow-up as needed  - CBC With Differential With Platelet  - Comp Metabolic Panel (14) [E]  - TSH W Reflex To Free T4  - HCG, Beta Subunit (Quant Pregnancy Test)    2. Postprandial hypoglycemia  Symptoms of hypotension and hypoglycemia status post large carbohydrate meal.  Has happened in the past.  Symptoms have since  resolved.  Check CBC, CMP, TSH and hCG.  Counseled on smaller more frequent meals.  Avoid large meals and 1 sitting  Drink water before during and after meals.  Limit alcohol caffeine  Red flags reviewed, follow-up as needed  - CBC With Differential With Platelet  - Comp Metabolic Panel (14) [E]  - TSH W Reflex To Free T4  - HCG, Beta Subunit (Quant Pregnancy Test)    3. Abnormal uterine bleeding  Reminded to follow-up with ultrasound and OB    Patient aware of plan of care. All questions answered to satisfaction of the patient. Patient instructed to call office or reach out via [employer] if any issues arise. For urgent issues and/or reviewed red flags please proceed to the urgent care or ER.  Also, inform the nurse practitioner with any new symptoms or medication side effects.        Return for Annual physical.    REESE Bauer, 9/27/2024, 10:51 AM

## 2024-09-28 NOTE — TELEPHONE ENCOUNTER
Patient has not yet read the [employer] message.   appointment  Message [phone]  From  Sunita Deluca RN To  Veronika Parks Sent and Delivered  9/27/2024 10:36 AM   Last Read in [employer]  Not Read           Future Appointments   Date Time Provider Department Center   9/30/2024  8:00 AM Mohit Valdez APRN [facility]

## 2024-09-30 ENCOUNTER — LAB ENCOUNTER (OUTPATIENT)
Dept: LAB | Age: 29
End: 2024-09-30
Payer: COMMERCIAL

## 2024-09-30 ENCOUNTER — OFFICE VISIT (OUTPATIENT)
Dept: FAMILY MEDICINE CLINIC | Facility: CLINIC | Age: 29
End: 2024-09-30
Payer: COMMERCIAL

## 2024-09-30 VITALS
SYSTOLIC BLOOD PRESSURE: 104 MMHG | DIASTOLIC BLOOD PRESSURE: 69 MMHG | WEIGHT: 171.19 LBS | HEART RATE: 62 BPM | BODY MASS INDEX: 27.51 KG/M2 | HEIGHT: 66 IN | OXYGEN SATURATION: 97 %

## 2024-09-30 DIAGNOSIS — N93.9 ABNORMAL UTERINE BLEEDING: ICD-10-CM

## 2024-09-30 DIAGNOSIS — R71.8 RBC MICROCYTOSIS: ICD-10-CM

## 2024-09-30 DIAGNOSIS — I95.89 POSTPRANDIAL HYPOTENSION: Primary | ICD-10-CM

## 2024-09-30 DIAGNOSIS — E16.1 POSTPRANDIAL HYPOGLYCEMIA: ICD-10-CM

## 2024-09-30 LAB
ALBUMIN SERPL-MCNC: 4.3 G/DL (ref 3.2–4.8)
ALBUMIN/GLOB SERPL: 1.8 {RATIO} (ref 1–2)
ALP LIVER SERPL-CCNC: 62 U/L
ALT SERPL-CCNC: 11 U/L
ANION GAP SERPL CALC-SCNC: 7 MMOL/L (ref 0–18)
AST SERPL-CCNC: 19 U/L (ref ?–34)
B-HCG SERPL-ACNC: <2.6 MIU/ML
BASOPHILS # BLD AUTO: 0.03 X10(3) UL (ref 0–0.2)
BASOPHILS NFR BLD AUTO: 0.5 %
BILIRUB SERPL-MCNC: 0.6 MG/DL (ref 0.3–1.2)
BUN BLD-MCNC: 10 MG/DL (ref 9–23)
BUN/CREAT SERPL: 13.3 (ref 10–20)
CALCIUM BLD-MCNC: 9.5 MG/DL (ref 8.7–10.4)
CHLORIDE SERPL-SCNC: 109 MMOL/L (ref 98–112)
CO2 SERPL-SCNC: 26 MMOL/L (ref 21–32)
CREAT BLD-MCNC: 0.75 MG/DL
DEPRECATED RDW RBC AUTO: 38.5 FL (ref 35.1–46.3)
EGFRCR SERPLBLD CKD-EPI 2021: 112 ML/MIN/1.73M2 (ref 60–?)
EOSINOPHIL # BLD AUTO: 0.34 X10(3) UL (ref 0–0.7)
EOSINOPHIL NFR BLD AUTO: 5.2 %
ERYTHROCYTE [DISTWIDTH] IN BLOOD BY AUTOMATED COUNT: 13.4 % (ref 11–15)
FASTING STATUS PATIENT QL REPORTED: NO
GLOBULIN PLAS-MCNC: 2.4 G/DL (ref 2–3.5)
GLUCOSE BLD-MCNC: 87 MG/DL (ref 70–99)
HCT VFR BLD AUTO: 40.2 %
HGB BLD-MCNC: 13.1 G/DL
IMM GRANULOCYTES # BLD AUTO: 0.02 X10(3) UL (ref 0–1)
IMM GRANULOCYTES NFR BLD: 0.3 %
LYMPHOCYTES # BLD AUTO: 2.49 X10(3) UL (ref 1–4)
LYMPHOCYTES NFR BLD AUTO: 38.2 %
MCH RBC QN AUTO: 26 PG (ref 26–34)
MCHC RBC AUTO-ENTMCNC: 32.6 G/DL (ref 31–37)
MCV RBC AUTO: 79.9 FL
MONOCYTES # BLD AUTO: 0.52 X10(3) UL (ref 0.1–1)
MONOCYTES NFR BLD AUTO: 8 %
NEUTROPHILS # BLD AUTO: 3.12 X10 (3) UL (ref 1.5–7.7)
NEUTROPHILS # BLD AUTO: 3.12 X10(3) UL (ref 1.5–7.7)
NEUTROPHILS NFR BLD AUTO: 47.8 %
OSMOLALITY SERPL CALC.SUM OF ELEC: 292 MOSM/KG (ref 275–295)
PLATELET # BLD AUTO: 262 10(3)UL (ref 150–450)
POTASSIUM SERPL-SCNC: 4.2 MMOL/L (ref 3.5–5.1)
PROT SERPL-MCNC: 6.7 G/DL (ref 5.7–8.2)
RBC # BLD AUTO: 5.03 X10(6)UL
SODIUM SERPL-SCNC: 142 MMOL/L (ref 136–145)
TSI SER-ACNC: 3.91 MIU/ML (ref 0.55–4.78)
WBC # BLD AUTO: 6.5 X10(3) UL (ref 4–11)

## 2024-09-30 PROCEDURE — 80053 COMPREHEN METABOLIC PANEL: CPT

## 2024-09-30 PROCEDURE — 84702 CHORIONIC GONADOTROPIN TEST: CPT

## 2024-09-30 PROCEDURE — 84443 ASSAY THYROID STIM HORMONE: CPT

## 2024-09-30 PROCEDURE — 85025 COMPLETE CBC W/AUTO DIFF WBC: CPT

## 2024-09-30 PROCEDURE — 36415 COLL VENOUS BLD VENIPUNCTURE: CPT

## 2024-09-30 PROCEDURE — 99213 OFFICE O/P EST LOW 20 MIN: CPT

## (undated) NOTE — LETTER
2/17/2018              Nazia Burgos         Dear Judi Gipson,    It was a pleasure to see you. You EKG/Electrocardiograma   was normal.  There is no need for further testing at this time.   I look forwar

## (undated) NOTE — LETTER
12/13/2018          To Whom It May Concern:    Una Rios is currently under my medical care and may not return to work at this time. Please excuse Tongoc Bevel for 0 days. She may return to work on 12/13/2018.   Activity is restricted as follows: no

## (undated) NOTE — LETTER
February 26, 2018    Arnie Clink      Dear Ghazala Jin:    The following are the results of your recent tests. Please review the list of test results.   Your result is the value on the left; we have also supplied th Ascorbic Acid Urine Negative Negative mg/dL   Squamous Epi.  Cells Few /HPF   WBC Urine 21 (H) 0 - 5 /HPF   RBC URINE 2 0 - 3 /HPF   Bacteria Urine Few (A) None Seen /HPF   -CBC W/ DIFFERENTIAL   Result Value Ref Range   WBC 5.6 4.0 - 11.0 K/UL   RBC 5.14 3

## (undated) NOTE — LETTER
4/11/2018              [facility]         Dear Mary Ann Silva,      It was a pleasure to see you at our [place] , [address], [address] [facility] office.   Your results are within normal limitsThere is no

## (undated) NOTE — LETTER
April 11, 2018     Suzi Dubose      Dear Arthurine Goods:    Below are the results from your recent visit:    Resulted Orders   AST (SGOT)   Result Value Ref Range    AST 19 15 - 41 U/L   ALT (SGPT)   Result Value

## (undated) NOTE — LETTER
4/16/2018          To Whom It May Concern:    Suzi Núñez is currently under my medical care and may return to work at this time. Please excuse Arthurine Goods for 4 weeks. Off work from 03/16/18 until 04/15/18  She may return to work on 04/16/18.   Activ

## (undated) NOTE — LETTER
2/5/2019          To Whom It May Concern:    Nanette Hernandez is currently under my medical care and may not return to work at this time. Please excuse Veronica Means for 0 days. She may return to work on 2/5/2019. Activity is restricted as follows: none.

## (undated) NOTE — LETTER
April 11, 2018     Josh Rolon      Dear Rebekah Cure:    Below are the results from your recent visit: Results are within normal limits.      Resulted Orders   AST (SGOT)   Result Value Ref Range    AST 19 15 - 41